# Patient Record
Sex: MALE | Race: WHITE | URBAN - METROPOLITAN AREA
[De-identification: names, ages, dates, MRNs, and addresses within clinical notes are randomized per-mention and may not be internally consistent; named-entity substitution may affect disease eponyms.]

---

## 2017-03-01 ENCOUNTER — MEDICAL CORRESPONDENCE (OUTPATIENT)
Dept: HEALTH INFORMATION MANAGEMENT | Facility: CLINIC | Age: 67
End: 2017-03-01

## 2017-05-06 ENCOUNTER — HOSPITAL ENCOUNTER (OUTPATIENT)
Dept: LAB | Facility: CLINIC | Age: 67
Discharge: HOME | End: 2017-05-06
Attending: INTERNAL MEDICINE | Admitting: INTERNAL MEDICINE
Payer: COMMERCIAL

## 2017-05-06 LAB — INR PPP: 2.04 (ref 0.86–1.14)

## 2017-05-06 PROCEDURE — 85610 PROTHROMBIN TIME: CPT | Performed by: INTERNAL MEDICINE

## 2017-05-06 PROCEDURE — 36415 COLL VENOUS BLD VENIPUNCTURE: CPT | Performed by: INTERNAL MEDICINE

## 2017-05-14 ENCOUNTER — HOSPITAL ENCOUNTER (EMERGENCY)
Facility: CLINIC | Age: 67
Discharge: HOME | End: 2017-05-14
Attending: FAMILY MEDICINE | Admitting: FAMILY MEDICINE
Payer: COMMERCIAL

## 2017-05-14 VITALS
WEIGHT: 290 LBS | TEMPERATURE: 96.5 F | HEART RATE: 70 BPM | RESPIRATION RATE: 16 BRPM | OXYGEN SATURATION: 96 % | DIASTOLIC BLOOD PRESSURE: 85 MMHG | SYSTOLIC BLOOD PRESSURE: 141 MMHG

## 2017-05-14 DIAGNOSIS — M25.571 ACUTE RIGHT ANKLE PAIN: ICD-10-CM

## 2017-05-14 PROCEDURE — 99282 EMERGENCY DEPT VISIT SF MDM: CPT

## 2017-05-14 PROCEDURE — 99283 EMERGENCY DEPT VISIT LOW MDM: CPT | Performed by: FAMILY MEDICINE

## 2017-05-14 RX ORDER — PREDNISONE 20 MG/1
20 TABLET ORAL 2 TIMES DAILY
Qty: 10 TABLET | Refills: 0 | Status: SHIPPED | OUTPATIENT
Start: 2017-05-14 | End: 2017-05-19

## 2017-05-14 RX ORDER — TRAMADOL HYDROCHLORIDE 50 MG/1
50-100 TABLET ORAL EVERY 6 HOURS PRN
Qty: 20 TABLET | Refills: 0 | Status: SHIPPED | OUTPATIENT
Start: 2017-05-14 | End: 2017-08-02

## 2017-05-14 NOTE — ED AVS SNAPSHOT
[facility] Emergency Department    [address] [address]    Phone:  [phone]    Fax:  [phone]                                       Shoaib Garcia   MRN: [id number]    Department:  [facility] Emergency Department   Date of Visit:  5/14/2017           Patient Information     Date Of Birth          1950        Your diagnoses for this visit were:     Acute right ankle pain        You were seen by Adam Luna MD.        Discharge Instructions       Return to the Emergency Room if the following occurs:     Worsened pain, expanding redness and swelling, fever, or for any concern at anytime.    Or, follow-up with the following provider as we discussed:     Return to your primary doctor as needed, or if not improved over the next 48 hours.    Medications discussed:    Use tylenol 1000 mg every six hours for pain, as needed.  Tramadol for pain not relieved by the above.    If the ankle is not improving, but getting more swollen and with worsened pain as discussed, then consider prednisone.    If you received pain-relieving or sedating medication during your time in the ER, avoid alcohol, driving automobiles, or working with machinery.  Also, a responsible adult must stay with you.      If you had X-rays or labs done we will attempt to contact you if there is a change needed in your care.      Call the Nurse Advice Line at [phone] or [phone] for any concern at anytime.      24 Hour Appointment Hotline       To make an appointment at any [facility] clinic, call [phone] ([phone]). If you don't have a family doctor or clinic, we will help you find one. [facility] clinics are conveniently located to serve the needs of you and your family.             Review of your medicines      START taking        Dose / Directions Last dose taken    predniSONE 20 MG tablet   Commonly known as:  DELTASONE   Dose:  20 mg   Quantity:  10 tablet        Take 1 tablet (20 mg)  "by mouth 2 times daily for 5 days   Refills:  0        traMADol 50 MG tablet   Commonly known as:  ULTRAM   Dose:   mg   Quantity:  20 tablet        Take 1-2 tablets ( mg) by mouth every 6 hours as needed for pain   Refills:  0                Prescriptions were sent or printed at these locations (2 Prescriptions)                   Other Prescriptions                Printed at Department/Unit printer (2 of 2)         predniSONE (DELTASONE) 20 MG tablet               traMADol (ULTRAM) 50 MG tablet                Orders Needing Specimen Collection     None      Pending Results     No orders found from 5/12/2017 to 5/15/2017.            Pending Culture Results     No orders found from 5/12/2017 to 5/15/2017.            Pending Results Instructions     If you had any lab results that were not finalized at the time of your Discharge, you can call the ED Lab Result RN at [phone]. You will be contacted by this team for any positive Lab results or changes in treatment. The nurses are available 7 days a week from 10A to 6:30P.  You can leave a message 24 hours per day and they will return your call.        Test Results From Your Hospital Stay               Thank you for choosing [facility]       Thank you for choosing [facility] for your care. Our goal is always to provide you with excellent care. Hearing back from our patients is one way we can continue to improve our services. Please take a few minutes to complete the written survey that you may receive in the mail after you visit with us. Thank you!        [employer] Information     [employer] lets you send messages to your doctor, view your test results, renew your prescriptions, schedule appointments and more. To sign up, go to [email or web address] . Click on \"Log in\" on the left side of the screen, which will take you to the Welcome page. Then click on \"Sign up Now\" on the right side of the page.     You will be asked to enter the access code listed below, as " well as some personal information. Please follow the directions to create your username and password.     Your access code is: [email or web address]  Expires: 2017  9:04 AM     Your access code will  in 90 days. If you need help or a new code, please call your [facility] clinic or [phone].        Care EveryWhere ID     This is your Care EveryWhere ID. This could be used by other organizations to access your [facility] medical records  [id number]        After Visit Summary       This is your record. Keep this with you and show to your community pharmacist(s) and doctor(s) at your next visit.

## 2017-05-14 NOTE — DISCHARGE INSTRUCTIONS
Return to the Emergency Room if the following occurs:     Worsened pain, expanding redness and swelling, fever, or for any concern at anytime.    Or, follow-up with the following provider as we discussed:     Return to your primary doctor as needed, or if not improved over the next 48 hours.    Medications discussed:    Use tylenol 1000 mg every six hours for pain, as needed.  Tramadol for pain not relieved by the above.    If the ankle is not improving, but getting more swollen and with worsened pain as discussed, then consider prednisone.    If you received pain-relieving or sedating medication during your time in the ER, avoid alcohol, driving automobiles, or working with machinery.  Also, a responsible adult must stay with you.      If you had X-rays or labs done we will attempt to contact you if there is a change needed in your care.      Call the Nurse Advice Line at [phone] or [phone] for any concern at anytime.

## 2017-05-14 NOTE — ED PROVIDER NOTES
HPI  Patient is a 67-year-old male presenting with left ankle pain.  Known history of gout.  His last episode of gouty arthritis was years ago.  The patient reports onset of symptoms yesterday morning.  The pain has been constant since onset.  There is no improvement.  The pain is localized to his left medial ankle.  Bearing weight or moving the ankle worsens his pain.  He denies obvious trauma.  He denies fever or illness.  No foot pain.  No leg pain.  No knee pain.  No skin changes that he reports.    ROS: All other review of systems are negative other than that noted above.    PMH: Reviewed.  SH: Reviewed.  FH: Reviewed.      PHYSICAL  /85  Pulse 70  Temp 96.5  F (35.8  C) (Temporal)  Resp 16  Wt 131.5 kg (290 lb)  SpO2 96%  General: Patient is alert and in moderate distress.  Neurological: Alert.  Moving upper and lower extremities equally, bilaterally.  Head / Neck: Atraumatic.  Ears: Not done.  Eyes: Pupils are equal, round, and reactive.  Normal conjunctiva.  Nose: Midline.  No epistaxis.  Mouth / Throat:  Moist. Respiratory: No respiratory distress. Cardiovascular: Regular rhythm.  Peripheral extremities are warm.  Abdomen / Pelvis: Not done.  Genitalia: Not done.  Musculoskeletal: He has tenderness when palpating along the medial left ankle.  There is local swelling here.  The anterior and lateral ankle are not obviously swollen or tender.  There is pain with range of motion but this is felt along the medial aspect only.  The left ankle in general is not hot to the touch.  There is some warmth overlying the medial aspect.  Skin: There is some mild redness or even ecchymosis along the medial ankle.  No open skin.        PHYSICIAN  The patient has medial ankle pain.  This would be an unusual presentation for gout given the localized nature of his pain.  The remaining joint is not tender or warm to the touch.  The ankle joint is not swollen in its entirety.  The patient tells me he is unable to  take NSAIDs.  I did recommend Tylenol and tramadol for pain control, as needed.  Another possibility would be infection or cellulitis or think this is less likely.  If he has expanding redness, swelling, warmth, and tenderness or fever he will return to the ED.  Another possibility is trauma that was missed.  He does report using alcohol on Friday night and there is some evidence of ecchymosis.  However, he is very apprehensive to accept this possibility and denies any trauma.  No imaging obtained as a result.  If not improving he needs follow up and likely imaging.      IMPRESSION    ICD-10-CM    1. Acute right ankle pain M25.571               Adam Luna MD  05/14/17 0907

## 2017-05-14 NOTE — ED AVS SNAPSHOT
[facility] Emergency Department    [address] [address]    Phone:  [phone]    Fax:  [phone]                                       Shoaib Garcia   MRN: [id number]    Department:  [facility] Emergency Department   Date of Visit:  5/14/2017           After Visit Summary Signature Page     I have received my discharge instructions, and my questions have been answered. I have discussed any challenges I see with this plan with the nurse or doctor.    ..........................................................................................................................................  Patient/Patient Representative Signature      ..........................................................................................................................................  Patient Representative Print Name and Relationship to Patient    ..................................................               ................................................  Date                                            Time    ..........................................................................................................................................  Reviewed by Signature/Title    ...................................................              ..............................................  Date                                                            Time

## 2017-08-02 ENCOUNTER — OFFICE VISIT (OUTPATIENT)
Dept: FAMILY MEDICINE | Facility: CLINIC | Age: 67
End: 2017-08-02
Payer: COMMERCIAL

## 2017-08-02 VITALS
HEART RATE: 64 BPM | BODY MASS INDEX: 36.83 KG/M2 | TEMPERATURE: 97.5 F | DIASTOLIC BLOOD PRESSURE: 79 MMHG | WEIGHT: 287 LBS | HEIGHT: 74 IN | SYSTOLIC BLOOD PRESSURE: 131 MMHG

## 2017-08-02 DIAGNOSIS — I48.20 CHRONIC ATRIAL FIBRILLATION (H): ICD-10-CM

## 2017-08-02 DIAGNOSIS — M72.2 PLANTAR FASCIITIS: Primary | ICD-10-CM

## 2017-08-02 LAB — INR PPP: 2.14 (ref 0.86–1.14)

## 2017-08-02 PROCEDURE — 85610 PROTHROMBIN TIME: CPT | Performed by: FAMILY MEDICINE

## 2017-08-02 PROCEDURE — 36415 COLL VENOUS BLD VENIPUNCTURE: CPT | Performed by: FAMILY MEDICINE

## 2017-08-02 PROCEDURE — 99203 OFFICE O/P NEW LOW 30 MIN: CPT | Mod: 25 | Performed by: FAMILY MEDICINE

## 2017-08-02 PROCEDURE — 20550 NJX 1 TENDON SHEATH/LIGAMENT: CPT | Performed by: FAMILY MEDICINE

## 2017-08-02 RX ORDER — MIRTAZAPINE 15 MG/1
15 TABLET, FILM COATED ORAL DAILY
COMMUNITY
Start: 2017-07-28

## 2017-08-02 RX ORDER — CARVEDILOL 25 MG/1
1 TABLET ORAL 2 TIMES DAILY
COMMUNITY
Start: 2017-05-19

## 2017-08-02 RX ORDER — DOFETILIDE 0.25 MG/1
250 CAPSULE ORAL DAILY
COMMUNITY
Start: 2017-05-13

## 2017-08-02 RX ORDER — ALBUTEROL SULFATE 90 UG/1
2 AEROSOL, METERED RESPIRATORY (INHALATION) 4 TIMES DAILY PRN
COMMUNITY
Start: 2017-04-11

## 2017-08-02 RX ORDER — TIOTROPIUM BROMIDE INHALATION SPRAY 3.12 UG/1
1 SPRAY, METERED RESPIRATORY (INHALATION) DAILY
COMMUNITY
Start: 2017-04-05

## 2017-08-02 RX ORDER — PAROXETINE 10 MG/1
10 TABLET, FILM COATED ORAL DAILY
COMMUNITY
Start: 2016-09-02

## 2017-08-02 RX ORDER — WARFARIN SODIUM 5 MG/1
5 TABLET ORAL SEE ADMIN INSTRUCTIONS
COMMUNITY
Start: 2017-05-06

## 2017-08-02 RX ORDER — DIAZEPAM 5 MG
5 TABLET ORAL DAILY
COMMUNITY
Start: 2017-02-21

## 2017-08-02 RX ORDER — TRIAMCINOLONE ACETONIDE 40 MG/ML
40 INJECTION, SUSPENSION INTRA-ARTICULAR; INTRAMUSCULAR ONCE
Qty: 1 ML | Refills: 0 | COMMUNITY
Start: 2017-08-02

## 2017-08-02 RX ORDER — SPIRONOLACTONE 25 MG/1
25 TABLET ORAL DAILY
COMMUNITY
Start: 2017-04-15

## 2017-08-02 RX ORDER — ATORVASTATIN CALCIUM 40 MG/1
40 TABLET, FILM COATED ORAL DAILY
COMMUNITY
Start: 2017-04-15

## 2017-08-02 NOTE — MR AVS SNAPSHOT
After Visit Summary   8/2/2017    Shoaib Garcia    MRN: [id number]           Patient Information     Date Of Birth          1950        Visit Information        Provider Department      8/2/2017 8:00 AM Eligio Smiley MD [facility]        Today's Diagnoses     Plantar fasciitis    -  1    Chronic atrial fibrillation (H)          Care Instructions          Thank you for choosing [facility].  You may be receiving a survey in the mail from [employer] regarding your visit today.  Please take a few minutes to complete and return the survey to let us know how we are doing.      If you have questions or concerns, please contact us via RADLIVE or you can contact your care team at [phone].    Our Clinic hours are:  Monday 6:40 am  to 7:00 pm  Tuesday -Friday 6:40 am to 5:00 pm    The [facility] outpatient lab hours are:  Monday - Friday 6:10 am to 4:45 pm  Saturdays 7:00 am to 11:00 am  Appointments are required, call [phone]    If you have clinical questions after hours or would like to schedule an appointment,  call the clinic at [phone].    (M72.2) Plantar fasciitis  (primary encounter diagnosis)  Comment:   Plan: INJECTION SINGLE TENDON SHEATH/LIGAMENT        We discussed the cause of the condition and to avoid going barefoot and use shoes with good arch support.   Avoid jumping and running. Use ice and avoid heat. Tylenol may be used. We discussed the options and will sterily inject 1 cc of Kenalog-40, with 1 cc of 1% plain Lidocaine  Into the area of tenderness on the plantar aspect of the right heel. This will be effective for 7-8 weeks. Follow up as needed.     (I48.2) Chronic atrial fibrillation (H)  Comment:   Plan: INR        We will do the INR today and call the results. Fax to [phone], to Dr. Chan Pradhan.           Follow-ups after your visit        Who to contact     If you have questions or need follow up information about today's clinic visit or  "your schedule please contact [facility] directly at [phone].  Normal or non-critical lab and imaging results will be communicated to you by MyChart, letter or phone within 4 business days after the clinic has received the results. If you do not hear from us within 7 days, please contact the clinic through [employer] or phone. If you have a critical or abnormal lab result, we will notify you by phone as soon as possible.  Submit refill requests through [employer]IS[employer]ZE or call your pharmacy and they will forward the refill request to us. Please allow 3 business days for your refill to be completed.          Additional Information About Your Visit        [employer] Information     [employer] lets you send messages to your doctor, view your test results, renew your prescriptions, schedule appointments and more. To sign up, go to [email or web address] . Click on \"Log in\" on the left side of the screen, which will take you to the Welcome page. Then click on \"Sign up Now\" on the right side of the page.     You will be asked to enter the access code listed below, as well as some personal information. Please follow the directions to create your username and password.     Your access code is: [email or web address]  Expires: 2017  9:04 AM     Your access code will  in 90 days. If you need help or a new code, please call your [facility] clinic or [phone].        Care EveryWhere ID     This is your Care EveryWhere ID. This could be used by other organizations to access your [facility] medical records  [id number]        Your Vitals Were     Pulse Temperature Height BMI (Body Mass Index)          64 97.5  F (36.4  C) (Tympanic) 6' 1.5\" (1.867 m) 37.35 kg/m2         Blood Pressure from Last 3 Encounters:   17 131/79   17 141/85    Weight from Last 3 Encounters:   17 287 lb (130.2 kg)   17 290 lb (131.5 kg)              We Performed the Following     INJECTION SINGLE TENDON SHEATH/LIGAMENT     INR  "       Primary Care Provider Office Phone # Fax #    Chan Pradhan [phone]       [facility] [address]  [place] [address]        Equal Access to Services     PIPO RODRIGUEZ : Hadii baudilio ku hadjudyo Soomaali, waaxda luqadaha, qaybta kaalmada adeegyada, lu de la pazcurtis chakraborty. So [facility] [phone].    ATENCIÓN: Si habla español, tiene a castelan disposición servicios gratuitos de asistencia lingüística. Llame al [phone].    We comply with applicable federal civil rights laws and [place] laws. We do not discriminate on the basis of race, color, national origin, age, disability sex, sexual orientation or gender identity.            Thank you!     Thank you for choosing [facility]  for your care. Our goal is always to provide you with excellent care. Hearing back from our patients is one way we can continue to improve our services. Please take a few minutes to complete the written survey that you may receive in the mail after your visit with us. Thank you!             Your Updated Medication List - Protect others around you: Learn how to safely use, store and throw away your medicines at www.disposemymeds.org.          This list is accurate as of: 8/2/17  8:36 AM.  Always use your most recent med list.                   Brand Name Dispense Instructions for use Diagnosis    atorvastatin 40 MG tablet    LIPITOR     Take 40 mg by mouth daily        carvedilol 25 MG tablet    COREG     Take 1 tablet by mouth 2 times daily        diazepam 5 MG tablet    VALIUM     Take 5 mg by mouth daily        dofetilide 250 MCG capsule    TIKOSYN     Take 250 capsules by mouth daily        JANTOVEN 5 MG tablet   Generic drug:  warfarin      Take 5 mg by mouth See Admin Instructions Taking 1/2 tablet on Monday, Wednesday and Friday.  Taking 5 mg on all other days.        magnesium oxide 400 (241.3 MG) MG tablet   Generic drug:  magnesium oxide      Take 400 mg by  mouth daily        mirtazapine 15 MG tablet    REMERON     Take 15 mg by mouth daily        PARoxetine 10 MG tablet    PAXIL     Take 10 mg by mouth daily        SPIRIVA RESPIMAT 2.5 MCG/ACT inhalation aerosol   Generic drug:  tiotropium      Spray 1 spray in nostril daily        spironolactone 25 MG tablet    ALDACTONE     Take 25 mg by mouth daily        tiZANidine 4 MG tablet    ZANAFLEX     Take 4 mg by mouth daily        VENTOLIN  (90 BASE) MCG/ACT Inhaler   Generic drug:  albuterol      Inhale 2 puffs into the lungs 4 times daily as needed

## 2017-08-02 NOTE — PROGRESS NOTES
SUBJECTIVE:                                                    Shoaib Garcia is a 67 year old male who presents to clinic today for the following health issues:      Musculoskeletal problem/pain      Duration: 3-4 weeks    Description  Location: Right heel pain.    Intensity:  moderate, severe    Accompanying signs and symptoms: none    History  Previous similar problem: no   Previous evaluation:  none    Precipitating or alleviating factors:  Trauma or overuse: no   Aggravating factors include: Walking, standing too long, waking him up at night.    Therapies tried and outcome: nothing      BLOOD DRAW:  He has a lab order today for PT/INR.    Frequency is weekly or as needed for Warfarin Therapy.  Diagnosis is Atrial Fibrillation.  Felisha fax results to [facility], [place] [address].  Fax is:  [phone].  Per Dr. Chan Pradhan.  Call [phone] with critical results.        Current Outpatient Prescriptions:      VENTOLIN  (90 BASE) MCG/ACT Inhaler, Inhale 2 puffs into the lungs 4 times daily as needed, Disp: , Rfl:      atorvastatin (LIPITOR) 40 MG tablet, Take 40 mg by mouth daily, Disp: , Rfl:      carvedilol (COREG) 25 MG tablet, Take 1 tablet by mouth 2 times daily, Disp: , Rfl:      diazepam (VALIUM) 5 MG tablet, Take 5 mg by mouth daily, Disp: , Rfl:      dofetilide (TIKOSYN) 250 MCG capsule, Take 250 capsules by mouth daily, Disp: , Rfl:      MAGNESIUM OXIDE 400 (241.3 MG) MG tablet, Take 400 mg by mouth daily, Disp: , Rfl:      mirtazapine (REMERON) 15 MG tablet, Take 15 mg by mouth daily, Disp: , Rfl:      PARoxetine (PAXIL) 10 MG tablet, Take 10 mg by mouth daily, Disp: , Rfl:      spironolactone (ALDACTONE) 25 MG tablet, Take 25 mg by mouth daily, Disp: , Rfl:      SPIRIVA RESPIMAT 2.5 MCG/ACT inhalation aerosol, Spray 1 spray in nostril daily, Disp: , Rfl:      tiZANidine (ZANAFLEX) 4 MG tablet, Take 4 mg by mouth daily, Disp: , Rfl:      JANTOVEN 5 MG  "tablet, Take 5 mg by mouth See Admin Instructions Taking 1/2 tablet on Monday, Wednesday and Friday.  Taking 5 mg on all other days., Disp: , Rfl:     There is no problem list on file for this patient.    Ten point review of systems negative, except as above.     Blood pressure 131/79, pulse 64, temperature 97.5  F (36.4  C), temperature source Tympanic, height 6' 1.5\" (1.867 m), weight 287 lb (130.2 kg).    Exam:  GENERAL APPEARANCE: healthy, alert and no distress  MS: tender to palpation on the plantar surface of the right heel. The ankle and tibia and metatarsal and the toes are normal.   The right Achilles tendon is normal.   SKIN: no suspicious lesions or rashes  PSYCH: mentation appears normal and affect normal/bright    (M72.2) Plantar fasciitis  (primary encounter diagnosis)  Comment:   Plan: INJECTION SINGLE TENDON SHEATH/LIGAMENT        We discussed the cause of the condition and to avoid going barefoot and use shoes with good arch support.   Avoid jumping and running. Use ice and avoid heat. Tylenol may be used. We discussed the options and will sterily inject 1 cc of Kenalog-40, with 1 cc of 1% plain Lidocaine  Into the area of tenderness on the plantar aspect of the right heel. This will be effective for 7-8 weeks. Follow up as needed.     (I48.2) Chronic atrial fibrillation (H)  Comment:   Plan: INR        We will do the INR today and call the results. Fax to [phone], to Dr. Chan Pradhan.     Eligio Smiley        "

## 2017-08-02 NOTE — NURSING NOTE
"Chief Complaint   Patient presents with     Musculoskeletal Problem     Right heel pain X 3-4 weeks.  No known injury.     Blood Draw     Blood draw order fro PT/INR.       Initial /79  Pulse 64  Temp 97.5  F (36.4  C) (Tympanic)  Ht 6' 1.5\" (1.867 m)  Wt 287 lb (130.2 kg)  BMI 37.35 kg/m2 Estimated body mass index is 37.35 kg/(m^2) as calculated from the following:    Height as of this encounter: 6' 1.5\" (1.867 m).    Weight as of this encounter: 287 lb (130.2 kg).  Medication Reconciliation: complete  "

## 2017-08-02 NOTE — PATIENT INSTRUCTIONS
Thank you for choosing [facility].  You may be receiving a survey in the mail from [employer] regarding your visit today.  Please take a few minutes to complete and return the survey to let us know how we are doing.      If you have questions or concerns, please contact us via SocialVest or you can contact your care team at [phone].    Our Clinic hours are:  Monday 6:40 am  to 7:00 pm  Tuesday -Friday 6:40 am to 5:00 pm    The [place] outpatient lab hours are:  Monday - Friday 6:10 am to 4:45 pm  Saturdays 7:00 am to 11:00 am  Appointments are required, call [phone]    If you have clinical questions after hours or would like to schedule an appointment,  call the clinic at [phone].    (M72.2) Plantar fasciitis  (primary encounter diagnosis)  Comment:   Plan: INJECTION SINGLE TENDON SHEATH/LIGAMENT        We discussed the cause of the condition and to avoid going barefoot and use shoes with good arch support.   Avoid jumping and running. Use ice and avoid heat. Tylenol may be used. We discussed the options and will sterily inject 1 cc of Kenalog-40, with 1 cc of 1% plain Lidocaine  Into the area of tenderness on the plantar aspect of the right heel. This will be effective for 7-8 weeks. Follow up as needed.     (I48.2) Chronic atrial fibrillation (H)  Comment:   Plan: INR        We will do the INR today and call the results. Fax to [phone], to Dr. Chan Pradhan.

## 2017-08-08 PROBLEM — I48.20 CHRONIC ATRIAL FIBRILLATION (H): Status: ACTIVE | Noted: 2017-08-08
